# Patient Record
Sex: FEMALE | Race: WHITE | NOT HISPANIC OR LATINO | Employment: UNEMPLOYED | URBAN - METROPOLITAN AREA
[De-identification: names, ages, dates, MRNs, and addresses within clinical notes are randomized per-mention and may not be internally consistent; named-entity substitution may affect disease eponyms.]

---

## 2023-06-26 DIAGNOSIS — I82.413 ACUTE BILATERAL DEEP VEIN THROMBOSIS (DVT) OF FEMORAL VEINS (MULTI): Primary | ICD-10-CM

## 2023-06-26 PROBLEM — F43.10 PTSD (POST-TRAUMATIC STRESS DISORDER): Status: ACTIVE | Noted: 2023-06-26

## 2023-06-26 PROBLEM — K50.90 CROHN'S DISEASE (MULTI): Status: ACTIVE | Noted: 2023-06-26

## 2023-06-26 PROBLEM — F41.9 ANXIETY AND DEPRESSION: Status: ACTIVE | Noted: 2023-06-26

## 2023-06-26 PROBLEM — K21.9 CHRONIC GERD: Status: ACTIVE | Noted: 2023-06-26

## 2023-06-26 PROBLEM — F32.A ANXIETY AND DEPRESSION: Status: ACTIVE | Noted: 2023-06-26

## 2023-06-26 PROBLEM — L85.3 DRY SKIN DERMATITIS: Status: ACTIVE | Noted: 2023-06-26

## 2023-06-26 PROBLEM — I87.2 VENOUS INSUFFICIENCY: Status: ACTIVE | Noted: 2023-06-26

## 2023-06-26 PROBLEM — J20.9 ACUTE BRONCHITIS: Status: RESOLVED | Noted: 2023-06-26 | Resolved: 2023-06-26

## 2023-06-26 PROBLEM — D64.9 ANEMIA: Status: ACTIVE | Noted: 2023-06-26

## 2023-06-26 RX ORDER — PANTOPRAZOLE SODIUM 20 MG/1
1 TABLET, DELAYED RELEASE ORAL DAILY
COMMUNITY
Start: 2022-04-29 | End: 2023-07-05

## 2023-06-26 RX ORDER — MULTIVIT-MIN/IRON/FOLIC ACID/K 18-600-40
1 CAPSULE ORAL DAILY
COMMUNITY
Start: 2022-04-29

## 2023-06-26 RX ORDER — AMMONIUM LACTATE 12 G/100G
CREAM TOPICAL
COMMUNITY
Start: 2022-05-06

## 2023-06-26 RX ORDER — PAROXETINE HYDROCHLORIDE 20 MG/1
1 TABLET, FILM COATED ORAL DAILY
COMMUNITY
Start: 2022-04-29

## 2023-06-26 RX ORDER — ALBUTEROL SULFATE 90 UG/1
AEROSOL, METERED RESPIRATORY (INHALATION)
COMMUNITY

## 2023-06-26 RX ORDER — LORAZEPAM 0.5 MG/1
TABLET ORAL
COMMUNITY
Start: 2022-04-29

## 2023-06-26 RX ORDER — APIXABAN 5 MG/1
TABLET, FILM COATED ORAL
Qty: 180 TABLET | Refills: 0 | Status: SHIPPED | OUTPATIENT
Start: 2023-06-26 | End: 2023-09-25

## 2023-06-26 RX ORDER — ACETAMINOPHEN 325 MG/1
TABLET ORAL EVERY 6 HOURS PRN
COMMUNITY
Start: 2022-06-17

## 2023-06-26 RX ORDER — APIXABAN 5 MG/1
1 TABLET, FILM COATED ORAL 2 TIMES DAILY
COMMUNITY
Start: 2022-04-29 | End: 2023-06-26 | Stop reason: SDUPTHER

## 2023-06-26 RX ORDER — FERROUS SULFATE 325(65) MG
TABLET ORAL
COMMUNITY
Start: 2022-04-29

## 2023-07-05 DIAGNOSIS — K21.9 CHRONIC GERD: Primary | ICD-10-CM

## 2023-07-05 RX ORDER — PANTOPRAZOLE SODIUM 20 MG/1
20 TABLET, DELAYED RELEASE ORAL DAILY
Qty: 90 TABLET | Refills: 0 | Status: SHIPPED | OUTPATIENT
Start: 2023-07-05 | End: 2023-09-21

## 2023-09-20 DIAGNOSIS — K21.9 CHRONIC GERD: ICD-10-CM

## 2023-09-20 PROBLEM — F41.9 ANXIETY ABOUT BLUSHING: Status: ACTIVE | Noted: 2023-09-20

## 2023-09-20 PROBLEM — J45.909 REACTIVE AIRWAY DISEASE (HHS-HCC): Status: ACTIVE | Noted: 2023-09-20

## 2023-09-20 PROBLEM — D68.9 BLOOD COAGULATION DISORDER (MULTI): Status: ACTIVE | Noted: 2023-09-20

## 2023-09-20 PROBLEM — T81.31XA DEHISCENCE OF OPERATIVE WOUND: Status: RESOLVED | Noted: 2023-09-20 | Resolved: 2023-09-20

## 2023-09-20 PROBLEM — J45.41: Status: ACTIVE | Noted: 2023-09-20

## 2023-09-20 PROBLEM — R60.0 LOWER LEG EDEMA: Status: RESOLVED | Noted: 2023-09-20 | Resolved: 2023-09-20

## 2023-09-20 PROBLEM — K92.2 GASTROINTESTINAL HEMORRHAGE: Status: RESOLVED | Noted: 2023-09-20 | Resolved: 2023-09-20

## 2023-09-20 PROBLEM — O21.0 HYPEREMESIS GRAVIDARUM (HHS-HCC): Status: RESOLVED | Noted: 2023-09-20 | Resolved: 2023-09-20

## 2023-09-20 PROBLEM — I82.409 DEEP VENOUS THROMBOSIS (MULTI): Status: ACTIVE | Noted: 2023-09-20

## 2023-09-20 PROBLEM — F41.9 ANXIETY: Status: ACTIVE | Noted: 2023-09-20

## 2023-09-20 PROBLEM — B99.9 INFECTION: Status: RESOLVED | Noted: 2023-09-20 | Resolved: 2023-09-20

## 2023-09-20 PROBLEM — R11.2 NAUSEA & VOMITING: Status: RESOLVED | Noted: 2023-09-20 | Resolved: 2023-09-20

## 2023-09-20 PROBLEM — R45.89 ANXIETY ABOUT BLUSHING: Status: ACTIVE | Noted: 2023-09-20

## 2023-09-20 RX ORDER — ALPRAZOLAM 0.25 MG/1
TABLET ORAL
COMMUNITY
Start: 2020-04-03

## 2023-09-20 RX ORDER — DESLORATADINE 5 MG/1
TABLET ORAL
COMMUNITY
Start: 2020-05-27

## 2023-09-20 RX ORDER — FERROUS GLUCONATE 324(38)MG
TABLET ORAL
COMMUNITY
End: 2023-10-16 | Stop reason: ALTCHOICE

## 2023-09-21 RX ORDER — PANTOPRAZOLE SODIUM 20 MG/1
20 TABLET, DELAYED RELEASE ORAL DAILY
Qty: 90 TABLET | Refills: 3 | Status: SHIPPED | OUTPATIENT
Start: 2023-09-21 | End: 2023-10-16 | Stop reason: SDUPTHER

## 2023-09-25 DIAGNOSIS — I82.413 ACUTE BILATERAL DEEP VEIN THROMBOSIS (DVT) OF FEMORAL VEINS (MULTI): ICD-10-CM

## 2023-09-25 RX ORDER — APIXABAN 5 MG/1
TABLET, FILM COATED ORAL
Qty: 180 TABLET | Refills: 0 | Status: SHIPPED | OUTPATIENT
Start: 2023-09-25 | End: 2023-10-16 | Stop reason: SDUPTHER

## 2023-09-29 ENCOUNTER — APPOINTMENT (OUTPATIENT)
Dept: PRIMARY CARE | Facility: CLINIC | Age: 38
End: 2023-09-29
Payer: OTHER GOVERNMENT

## 2023-10-02 ENCOUNTER — APPOINTMENT (OUTPATIENT)
Dept: PRIMARY CARE | Facility: CLINIC | Age: 38
End: 2023-10-02
Payer: OTHER GOVERNMENT

## 2023-10-16 ENCOUNTER — OFFICE VISIT (OUTPATIENT)
Dept: PRIMARY CARE | Facility: CLINIC | Age: 38
End: 2023-10-16
Payer: OTHER GOVERNMENT

## 2023-10-16 VITALS
DIASTOLIC BLOOD PRESSURE: 74 MMHG | HEART RATE: 111 BPM | OXYGEN SATURATION: 97 % | TEMPERATURE: 97.8 F | SYSTOLIC BLOOD PRESSURE: 122 MMHG | HEIGHT: 67 IN | WEIGHT: 285.2 LBS | BODY MASS INDEX: 44.76 KG/M2

## 2023-10-16 DIAGNOSIS — I82.413 ACUTE BILATERAL DEEP VEIN THROMBOSIS (DVT) OF FEMORAL VEINS (MULTI): ICD-10-CM

## 2023-10-16 DIAGNOSIS — J45.41: ICD-10-CM

## 2023-10-16 DIAGNOSIS — K21.9 CHRONIC GERD: ICD-10-CM

## 2023-10-16 DIAGNOSIS — J40 BRONCHITIS: Primary | ICD-10-CM

## 2023-10-16 PROBLEM — O21.0 HYPEREMESIS GRAVIDARUM (HHS-HCC): Status: ACTIVE | Noted: 2023-09-20

## 2023-10-16 PROBLEM — R60.0 LOWER LEG EDEMA: Status: ACTIVE | Noted: 2023-09-20

## 2023-10-16 PROBLEM — D50.9 MICROCYTIC ANEMIA: Status: ACTIVE | Noted: 2023-06-26

## 2023-10-16 PROBLEM — K92.2 GASTROINTESTINAL HEMORRHAGE: Status: ACTIVE | Noted: 2023-09-20

## 2023-10-16 PROCEDURE — 99214 OFFICE O/P EST MOD 30 MIN: CPT | Performed by: NURSE PRACTITIONER

## 2023-10-16 PROCEDURE — 1036F TOBACCO NON-USER: CPT | Performed by: NURSE PRACTITIONER

## 2023-10-16 RX ORDER — LEVOFLOXACIN 500 MG/1
500 TABLET, FILM COATED ORAL DAILY
Qty: 10 TABLET | Refills: 0 | Status: SHIPPED | OUTPATIENT
Start: 2023-10-16 | End: 2023-10-27 | Stop reason: ALTCHOICE

## 2023-10-16 RX ORDER — PREDNISONE 10 MG/1
TABLET ORAL
Qty: 39 TABLET | Refills: 0 | Status: SHIPPED | OUTPATIENT
Start: 2023-10-16 | End: 2023-10-27 | Stop reason: ALTCHOICE

## 2023-10-16 RX ORDER — PREDNISONE 20 MG/1
40 TABLET ORAL DAILY
Qty: 10 TABLET | Refills: 0 | Status: SHIPPED | OUTPATIENT
Start: 2023-10-16 | End: 2023-10-16 | Stop reason: SDUPTHER

## 2023-10-16 RX ORDER — ALBUTEROL SULFATE 90 UG/1
2 AEROSOL, METERED RESPIRATORY (INHALATION) EVERY 4 HOURS PRN
Qty: 8.5 G | Refills: 0 | Status: SHIPPED | OUTPATIENT
Start: 2023-10-16 | End: 2024-10-15

## 2023-10-16 RX ORDER — PREDNISONE 10 MG/1
TABLET ORAL
Qty: 39 TABLET | Refills: 0 | Status: CANCELLED | OUTPATIENT
Start: 2023-10-16 | End: 2023-10-28

## 2023-10-16 RX ORDER — PANTOPRAZOLE SODIUM 20 MG/1
20 TABLET, DELAYED RELEASE ORAL DAILY
Qty: 90 TABLET | Refills: 3 | Status: SHIPPED | OUTPATIENT
Start: 2023-10-16 | End: 2024-04-03 | Stop reason: SDUPTHER

## 2023-10-16 ASSESSMENT — ENCOUNTER SYMPTOMS
CHEST TIGHTNESS: 1
FEVER: 0
GASTROINTESTINAL NEGATIVE: 1
WHEEZING: 1
COUGH: 1
MYALGIAS: 0
PSYCHIATRIC NEGATIVE: 1
SPUTUM PRODUCTION: 1
NEUROLOGICAL NEGATIVE: 1
CARDIOVASCULAR NEGATIVE: 1
SHORTNESS OF BREATH: 1
SORE THROAT: 1

## 2023-10-16 ASSESSMENT — COLUMBIA-SUICIDE SEVERITY RATING SCALE - C-SSRS
2. HAVE YOU ACTUALLY HAD ANY THOUGHTS OF KILLING YOURSELF?: NO
1. IN THE PAST MONTH, HAVE YOU WISHED YOU WERE DEAD OR WISHED YOU COULD GO TO SLEEP AND NOT WAKE UP?: NO
6. HAVE YOU EVER DONE ANYTHING, STARTED TO DO ANYTHING, OR PREPARED TO DO ANYTHING TO END YOUR LIFE?: NO

## 2023-10-16 ASSESSMENT — PATIENT HEALTH QUESTIONNAIRE - PHQ9
1. LITTLE INTEREST OR PLEASURE IN DOING THINGS: NOT AT ALL
2. FEELING DOWN, DEPRESSED OR HOPELESS: NOT AT ALL
SUM OF ALL RESPONSES TO PHQ9 QUESTIONS 1 AND 2: 0

## 2023-10-16 NOTE — PROGRESS NOTES
"Subjective   Patient ID: Taya Sultana is a 38 y.o. female who presents for Cough (Almost 2 weeks), Nasal Congestion, and Wheezing.    Cough, Congestion, and wheezing: started 2 weeks ago. It started as a sinus cold and feels it has moved to her lungs. She has a history of bronchitis.     Cough  This is a new problem. The current episode started 1 to 4 weeks ago. The problem has been gradually worsening. The problem occurs every few minutes. The cough is Productive of sputum. Associated symptoms include nasal congestion, postnasal drip, a sore throat, shortness of breath and wheezing. Pertinent negatives include no fever or myalgias. Nothing aggravates the symptoms. She has tried OTC cough suppressant for the symptoms. The treatment provided mild relief.   Wheezing   This is a new problem. The current episode started 1 to 4 weeks ago. The problem occurs 2 to 4 times per day. The problem has been gradually worsening. Associated symptoms include coughing, shortness of breath, a sore throat and sputum production. Pertinent negatives include no fever. Nothing aggravates the symptoms. She has tried OTC cough suppressant for the symptoms. The treatment provided no relief.        Review of Systems   Constitutional:  Negative for fever.   HENT:  Positive for postnasal drip and sore throat.    Respiratory:  Positive for cough, sputum production, chest tightness, shortness of breath and wheezing.    Cardiovascular: Negative.    Gastrointestinal: Negative.    Genitourinary: Negative.    Musculoskeletal:  Negative for myalgias.   Neurological: Negative.    Psychiatric/Behavioral: Negative.         Objective   /74   Pulse (!) 111   Temp 36.6 °C (97.8 °F)   Ht 1.702 m (5' 7\")   Wt 129 kg (285 lb 3.2 oz)   SpO2 97%   BMI 44.67 kg/m²     Physical Exam  Vitals reviewed.   Constitutional:       Appearance: Normal appearance.   HENT:      Head: Normocephalic.      Right Ear: Tympanic membrane, ear canal and external ear " normal.      Left Ear: Tympanic membrane, ear canal and external ear normal.      Nose: Congestion present.      Mouth/Throat:      Mouth: Mucous membranes are moist.   Cardiovascular:      Rate and Rhythm: Normal rate and regular rhythm.      Heart sounds: Normal heart sounds.   Pulmonary:      Effort: Pulmonary effort is normal.      Breath sounds: Wheezing present.      Comments: Cough heard on exam  Skin:     General: Skin is warm.   Neurological:      General: No focal deficit present.      Mental Status: She is alert and oriented to person, place, and time.         Assessment/Plan   Problem List Items Addressed This Visit             ICD-10-CM    Acute bilateral deep vein thrombosis (DVT) of femoral veins (CMS/[facility]) I82.413    Relevant Medications    apixaban (Eliquis) 5 mg tablet    Chronic GERD K21.9    Relevant Medications    pantoprazole (ProtoNix) 20 mg EC tablet    Moderate persistent asthma with allergic rhinitis with acute exacerbation J45.41    Relevant Medications    predniSONE (Deltasone) 10 mg tablet     Other Visit Diagnoses         Codes    Bronchitis    -  Primary J40    Relevant Medications    albuterol (ProAir HFA) 90 mcg/actuation inhaler    levoFLOXacin (Levaquin) 500 mg tablet    predniSONE (Deltasone) 10 mg tablet

## 2023-10-16 NOTE — PATIENT INSTRUCTIONS
You have bronchitis, and because you also have significant asthma we're treating it carefully. You'll be taking three medications: Albuterol, Prednisone, and Levaquin.    If your symptoms get worse or if you experience any severe symptoms, please don't hesitate to call 911 or go to the ER immediately. Your safety is important, and we're here to help you if you need it.

## 2023-10-27 ENCOUNTER — OFFICE VISIT (OUTPATIENT)
Dept: PRIMARY CARE | Facility: CLINIC | Age: 38
End: 2023-10-27
Payer: OTHER GOVERNMENT

## 2023-10-27 ENCOUNTER — ANCILLARY PROCEDURE (OUTPATIENT)
Dept: RADIOLOGY | Facility: CLINIC | Age: 38
End: 2023-10-27
Payer: OTHER GOVERNMENT

## 2023-10-27 VITALS
HEART RATE: 108 BPM | SYSTOLIC BLOOD PRESSURE: 128 MMHG | DIASTOLIC BLOOD PRESSURE: 76 MMHG | WEIGHT: 281.4 LBS | OXYGEN SATURATION: 98 % | HEIGHT: 67 IN | BODY MASS INDEX: 44.17 KG/M2 | TEMPERATURE: 98.1 F

## 2023-10-27 DIAGNOSIS — J45.41: ICD-10-CM

## 2023-10-27 DIAGNOSIS — J45.41: Primary | ICD-10-CM

## 2023-10-27 DIAGNOSIS — R06.02 SHORT OF BREATH ON EXERTION: ICD-10-CM

## 2023-10-27 PROCEDURE — 71046 X-RAY EXAM CHEST 2 VIEWS: CPT | Performed by: RADIOLOGY

## 2023-10-27 PROCEDURE — 71046 X-RAY EXAM CHEST 2 VIEWS: CPT | Mod: FY

## 2023-10-27 PROCEDURE — 99213 OFFICE O/P EST LOW 20 MIN: CPT | Performed by: NURSE PRACTITIONER

## 2023-10-27 PROCEDURE — 87634 RSV DNA/RNA AMP PROBE: CPT

## 2023-10-27 PROCEDURE — 87636 SARSCOV2 & INF A&B AMP PRB: CPT

## 2023-10-27 PROCEDURE — 1036F TOBACCO NON-USER: CPT | Performed by: NURSE PRACTITIONER

## 2023-10-27 RX ORDER — PREDNISONE 20 MG/1
40 TABLET ORAL DAILY
Qty: 10 TABLET | Refills: 0 | Status: SHIPPED | OUTPATIENT
Start: 2023-10-27 | End: 2023-11-01

## 2023-10-27 RX ORDER — ALBUTEROL SULFATE 0.83 MG/ML
2.5 SOLUTION RESPIRATORY (INHALATION) 4 TIMES DAILY PRN
Qty: 320 ML | Refills: 1 | Status: SHIPPED | OUTPATIENT
Start: 2023-10-27 | End: 2024-10-26

## 2023-10-27 RX ORDER — FLUTICASONE PROPIONATE AND SALMETEROL 250; 50 UG/1; UG/1
1 POWDER RESPIRATORY (INHALATION)
Qty: 60 EACH | Refills: 2 | Status: SHIPPED | OUTPATIENT
Start: 2023-10-27 | End: 2024-01-25

## 2023-10-27 ASSESSMENT — PATIENT HEALTH QUESTIONNAIRE - PHQ9
2. FEELING DOWN, DEPRESSED OR HOPELESS: NOT AT ALL
SUM OF ALL RESPONSES TO PHQ9 QUESTIONS 1 AND 2: 0
1. LITTLE INTEREST OR PLEASURE IN DOING THINGS: NOT AT ALL

## 2023-10-27 ASSESSMENT — ENCOUNTER SYMPTOMS
COUGH: 1
CHILLS: 0
WHEEZING: 1
FATIGUE: 1
FEVER: 0
GASTROINTESTINAL NEGATIVE: 1
CARDIOVASCULAR NEGATIVE: 1
SHORTNESS OF BREATH: 1

## 2023-10-27 ASSESSMENT — COLUMBIA-SUICIDE SEVERITY RATING SCALE - C-SSRS
6. HAVE YOU EVER DONE ANYTHING, STARTED TO DO ANYTHING, OR PREPARED TO DO ANYTHING TO END YOUR LIFE?: NO
2. HAVE YOU ACTUALLY HAD ANY THOUGHTS OF KILLING YOURSELF?: NO
1. IN THE PAST MONTH, HAVE YOU WISHED YOU WERE DEAD OR WISHED YOU COULD GO TO SLEEP AND NOT WAKE UP?: NO

## 2023-10-27 NOTE — PROGRESS NOTES
"Subjective   Patient ID: Taya Sultana is a 38 y.o. female who presents for Follow-up (Cough ).    Bronchitis: Treated with antiboitic levaquin and prednisone as well as inhalers. She reprots she noticed improvement of her upper airway sinus she has improvement but not her cough or lungs. She continues to have tight cough and sob.           Review of Systems   Constitutional:  Positive for fatigue. Negative for chills and fever.   Respiratory:  Positive for cough, shortness of breath and wheezing.    Cardiovascular: Negative.    Gastrointestinal: Negative.    Genitourinary: Negative.        Objective   /76   Pulse 108   Temp 36.7 °C (98.1 °F)   Ht 1.702 m (5' 7\")   Wt 128 kg (281 lb 6.4 oz)   LMP 10/02/2023   SpO2 98%   BMI 44.07 kg/m²     Physical Exam  Constitutional:       Appearance: She is ill-appearing.   HENT:      Head: Normocephalic.      Right Ear: Tympanic membrane and ear canal normal.      Left Ear: Tympanic membrane and ear canal normal.      Nose: No congestion.      Mouth/Throat:      Mouth: Mucous membranes are moist.   Cardiovascular:      Rate and Rhythm: Normal rate.      Heart sounds: Normal heart sounds.   Pulmonary:      Breath sounds: Examination of the right-upper field reveals wheezing. Examination of the left-upper field reveals wheezing. Examination of the right-middle field reveals wheezing. Examination of the left-middle field reveals wheezing. Examination of the right-lower field reveals wheezing. Examination of the left-lower field reveals wheezing. Wheezing present.      Comments: Tight cough heard on exam  Skin:     Capillary Refill: Capillary refill takes less than 2 seconds.   Neurological:      General: No focal deficit present.      Mental Status: She is oriented to person, place, and time.   Psychiatric:         Mood and Affect: Mood normal.         Behavior: Behavior normal.         Thought Content: Thought content normal.         Judgment: Judgment normal. "         Assessment/Plan   Problem List Items Addressed This Visit             ICD-10-CM    Moderate persistent asthma with allergic rhinitis with acute exacerbation - Primary J45.41    Relevant Medications    fluticasone propion-salmeteroL (Advair Diskus) 250-50 mcg/dose diskus inhaler    albuterol 2.5 mg /3 mL (0.083 %) nebulizer solution    predniSONE (Deltasone) 20 mg tablet    Other Relevant Orders    XR chest 2 views     Other Visit Diagnoses         Codes    Short of breath on exertion     R06.02    Relevant Medications    predniSONE (Deltasone) 20 mg tablet    Other Relevant Orders    Sars-CoV-2 PCR, Symptomatic    RSV PCR    Influenza A, and B PCR

## 2023-10-28 LAB
FLUAV RNA RESP QL NAA+PROBE: NOT DETECTED
FLUBV RNA RESP QL NAA+PROBE: NOT DETECTED
RSV RNA RESP QL NAA+PROBE: NOT DETECTED
SARS-COV-2 RNA RESP QL NAA+PROBE: NOT DETECTED

## 2023-12-27 NOTE — PATIENT INSTRUCTIONS
Has the pt been called?   Medication Plan: To address your breathing difficulties, we will take the following steps:    Another Burst of Prednisone: I am prescribing another round of prednisone to help manage inflammation and improve your breathing.  Advair Inhaler: You will start using an Advair inhaler as part of your daily maintenance. Please remember to rinse your mouth thoroughly after each use. Use it twice daily as prescribed.  Nebulizer Solution: I am also sending nebulizer solution for albuterol to help relieve shortness of breath or cough. You can use it every 6 hours or as needed.  Diagnostic Testing: Given the worsening of your symptoms, I have ordered a chest x-ray to provide additional insight into your condition.    Emergency Situations: In the event of severe shortness of breath, spiked fevers, or chills, please do not hesitate to call 911 or go directly to the emergency room for immediate evaluation and treatment.    COVID/Influenza/RSV Testing: We are also sending a swab for COVID, influenza, and RSV testing. Once the results are available, we will notify you promptly.    Provider Transition: As we discussed, I want to inform you that I will be leaving [facility]. This decision was not made lightly, and I deeply appreciate the trust you've placed in me for your care.    Follow-Up Options: You have several follow-up options available to you:    Dr. Carpenter is still available in this office, and you can schedule a follow-up with him.  Additionally, we will provide you with a list of nurse practitioners for consideration. A letter will be sent to you with their names and contact information.  Your health and well-being are of utmost importance, and I want to ensure a smooth transition in your care. If you have any questions or concerns at any point during your treatment, please reach out to us. Your trust in our care has been greatly appreciated.

## 2024-04-03 ENCOUNTER — TELEPHONE (OUTPATIENT)
Dept: PRIMARY CARE | Facility: CLINIC | Age: 39
End: 2024-04-03
Payer: OTHER GOVERNMENT

## 2024-04-03 DIAGNOSIS — I82.413 ACUTE BILATERAL DEEP VEIN THROMBOSIS (DVT) OF FEMORAL VEINS (MULTI): ICD-10-CM

## 2024-04-03 DIAGNOSIS — K21.9 CHRONIC GERD: ICD-10-CM

## 2024-04-03 NOTE — TELEPHONE ENCOUNTER
Ph[facility]: [facility]   Medication(s): eliquis 5 mg twice daily    Protonix 20 mg once daily   Dose:^  Qty: 6 months   Last Office Visit: 10/27/2023 KT   Next Office Visit:  08/05/2024 DARYA

## 2024-04-05 RX ORDER — PANTOPRAZOLE SODIUM 20 MG/1
20 TABLET, DELAYED RELEASE ORAL DAILY
Qty: 30 TABLET | Refills: 0 | Status: SHIPPED | OUTPATIENT
Start: 2024-04-05

## 2024-04-15 ENCOUNTER — TELEPHONE (OUTPATIENT)
Dept: PRIMARY CARE | Facility: CLINIC | Age: 39
End: 2024-04-15
Payer: OTHER GOVERNMENT

## 2024-04-15 DIAGNOSIS — I82.413 ACUTE BILATERAL DEEP VEIN THROMBOSIS (DVT) OF FEMORAL VEINS (MULTI): ICD-10-CM

## 2024-04-15 NOTE — TELEPHONE ENCOUNTER
Patient was sent a 30 day supply but is not scheduled until 8/5/2024     Can we send a 6 month to [facility] to get her through to her appointment.

## 2024-06-14 PROBLEM — E66.9 OBESITY WITH BODY MASS INDEX 30 OR GREATER: Status: ACTIVE | Noted: 2023-10-14

## 2024-06-14 PROBLEM — D50.0 IRON DEFICIENCY ANEMIA DUE TO CHRONIC BLOOD LOSS: Status: ACTIVE | Noted: 2019-02-27

## 2024-06-18 ENCOUNTER — APPOINTMENT (OUTPATIENT)
Dept: PRIMARY CARE | Facility: CLINIC | Age: 39
End: 2024-06-18
Payer: OTHER GOVERNMENT

## 2024-06-18 VITALS
SYSTOLIC BLOOD PRESSURE: 122 MMHG | DIASTOLIC BLOOD PRESSURE: 80 MMHG | BODY MASS INDEX: 38.17 KG/M2 | HEART RATE: 95 BPM | OXYGEN SATURATION: 99 % | HEIGHT: 67 IN | WEIGHT: 243.2 LBS

## 2024-06-18 DIAGNOSIS — I82.90 DEEP VEIN THROMBOSIS (DVT) OF NON-EXTREMITY VEIN, UNSPECIFIED CHRONICITY: ICD-10-CM

## 2024-06-18 DIAGNOSIS — E55.9 VITAMIN D DEFICIENCY: ICD-10-CM

## 2024-06-18 DIAGNOSIS — K50.919 CROHN'S DISEASE WITH COMPLICATION, UNSPECIFIED GASTROINTESTINAL TRACT LOCATION (MULTI): ICD-10-CM

## 2024-06-18 DIAGNOSIS — E28.2 PCOS (POLYCYSTIC OVARIAN SYNDROME): ICD-10-CM

## 2024-06-18 DIAGNOSIS — E66.9 OBESITY WITH BODY MASS INDEX 30 OR GREATER: ICD-10-CM

## 2024-06-18 DIAGNOSIS — Z00.00 ROUTINE ADULT HEALTH MAINTENANCE: Primary | ICD-10-CM

## 2024-06-18 DIAGNOSIS — F41.9 ANXIETY: ICD-10-CM

## 2024-06-18 DIAGNOSIS — J45.909 REACTIVE AIRWAY DISEASE WITHOUT COMPLICATION, UNSPECIFIED ASTHMA SEVERITY, UNSPECIFIED WHETHER PERSISTENT (HHS-HCC): ICD-10-CM

## 2024-06-18 PROCEDURE — 99214 OFFICE O/P EST MOD 30 MIN: CPT | Performed by: NURSE PRACTITIONER

## 2024-06-18 PROCEDURE — 1036F TOBACCO NON-USER: CPT | Performed by: NURSE PRACTITIONER

## 2024-06-18 RX ORDER — LORAZEPAM 0.5 MG/1
0.5 TABLET ORAL DAILY PRN
Qty: 15 TABLET | Refills: 0 | Status: SHIPPED | OUTPATIENT
Start: 2024-06-18 | End: 2024-07-18

## 2024-06-18 ASSESSMENT — ENCOUNTER SYMPTOMS
ABDOMINAL PAIN: 0
ABDOMINAL DISTENTION: 0
HEADACHES: 0
BRUISES/BLEEDS EASILY: 0
SEIZURES: 0
DIZZINESS: 0
CONSTIPATION: 0
SHORTNESS OF BREATH: 0
WHEEZING: 0
PALPITATIONS: 0
MYALGIAS: 0
NAUSEA: 0
TROUBLE SWALLOWING: 0
COUGH: 0
WOUND: 0
JOINT SWELLING: 0
FATIGUE: 0
DIARRHEA: 0
COLOR CHANGE: 0
WEAKNESS: 0
EYE PAIN: 0
ADENOPATHY: 0
FEVER: 0
CHILLS: 0
DIFFICULTY URINATING: 0

## 2024-06-18 ASSESSMENT — PATIENT HEALTH QUESTIONNAIRE - PHQ9
2. FEELING DOWN, DEPRESSED OR HOPELESS: NOT AT ALL
1. LITTLE INTEREST OR PLEASURE IN DOING THINGS: NOT AT ALL
1. LITTLE INTEREST OR PLEASURE IN DOING THINGS: NOT AT ALL
2. FEELING DOWN, DEPRESSED OR HOPELESS: NOT AT ALL
SUM OF ALL RESPONSES TO PHQ9 QUESTIONS 1 AND 2: 0
SUM OF ALL RESPONSES TO PHQ9 QUESTIONS 1 AND 2: 0

## 2024-06-18 NOTE — ASSESSMENT & PLAN NOTE
Patient continues on Eliquis twice daily without issue.  Bilateral lower extremity blood clots originally reported in 2022.  Patient still reports history of blood clots as a teenager.  She states that Crohn's disease has put her at an increased risk for clotting.  Will continue OAC for now.  Consider hematology follow-up in the future to discuss continued need of blood thinner.
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
## 2024-06-18 NOTE — ASSESSMENT & PLAN NOTE
Stable per patient.  Provider to be notified of any new or worsening gastric concerns as specialty referral may need to be placed.

## 2024-06-18 NOTE — ASSESSMENT & PLAN NOTE
Patient to continue with current inhaler regiment. She is to notify provider for any persistent or worsening respiratory concerns

## 2024-06-18 NOTE — ASSESSMENT & PLAN NOTE
Reportedly stable off of maintenance medication.  Prescription for lorazepam renewed and to be used as needed for panic attacks.  Consent signed today and urine screening obtained.  Patient to notify provider for any persistent or new mood concerns.

## 2024-08-05 ENCOUNTER — APPOINTMENT (OUTPATIENT)
Dept: PRIMARY CARE | Facility: CLINIC | Age: 39
End: 2024-08-05
Payer: OTHER GOVERNMENT

## 2024-10-02 ENCOUNTER — LAB (OUTPATIENT)
Dept: LAB | Facility: LAB | Age: 39
End: 2024-10-02
Payer: OTHER GOVERNMENT

## 2024-10-02 DIAGNOSIS — Z00.00 ROUTINE ADULT HEALTH MAINTENANCE: ICD-10-CM

## 2024-10-02 DIAGNOSIS — E66.9 OBESITY WITH BODY MASS INDEX 30 OR GREATER: ICD-10-CM

## 2024-10-02 DIAGNOSIS — E55.9 VITAMIN D DEFICIENCY: ICD-10-CM

## 2024-10-02 LAB
25(OH)D3 SERPL-MCNC: 25 NG/ML (ref 30–100)
ALBUMIN SERPL BCP-MCNC: 3.4 G/DL (ref 3.4–5)
ALP SERPL-CCNC: 67 U/L (ref 33–110)
ALT SERPL W P-5'-P-CCNC: 6 U/L (ref 7–45)
ANION GAP SERPL CALC-SCNC: 11 MMOL/L (ref 10–20)
AST SERPL W P-5'-P-CCNC: 11 U/L (ref 9–39)
BASOPHILS # BLD AUTO: 0.07 X10*3/UL (ref 0–0.1)
BASOPHILS NFR BLD AUTO: 0.6 %
BILIRUB SERPL-MCNC: 0.2 MG/DL (ref 0–1.2)
BUN SERPL-MCNC: 9 MG/DL (ref 6–23)
CALCIUM SERPL-MCNC: 8.9 MG/DL (ref 8.6–10.3)
CHLORIDE SERPL-SCNC: 103 MMOL/L (ref 98–107)
CHOLEST SERPL-MCNC: 138 MG/DL (ref 0–199)
CHOLESTEROL/HDL RATIO: 2.4
CO2 SERPL-SCNC: 28 MMOL/L (ref 21–32)
CREAT SERPL-MCNC: 0.83 MG/DL (ref 0.5–1.05)
EGFRCR SERPLBLD CKD-EPI 2021: >90 ML/MIN/1.73M*2
EOSINOPHIL # BLD AUTO: 0.75 X10*3/UL (ref 0–0.7)
EOSINOPHIL NFR BLD AUTO: 6.7 %
ERYTHROCYTE [DISTWIDTH] IN BLOOD BY AUTOMATED COUNT: 15.7 % (ref 11.5–14.5)
EST. AVERAGE GLUCOSE BLD GHB EST-MCNC: 94 MG/DL
GLUCOSE SERPL-MCNC: 94 MG/DL (ref 74–99)
HBA1C MFR BLD: 4.9 %
HCT VFR BLD AUTO: 34.8 % (ref 36–46)
HDLC SERPL-MCNC: 56.7 MG/DL
HGB BLD-MCNC: 10 G/DL (ref 12–16)
IMM GRANULOCYTES # BLD AUTO: 0.03 X10*3/UL (ref 0–0.7)
IMM GRANULOCYTES NFR BLD AUTO: 0.3 % (ref 0–0.9)
IRON SATN MFR SERPL: 5 % (ref 25–45)
IRON SERPL-MCNC: 19 UG/DL (ref 35–150)
LDLC SERPL CALC-MCNC: 61 MG/DL
LYMPHOCYTES # BLD AUTO: 2.67 X10*3/UL (ref 1.2–4.8)
LYMPHOCYTES NFR BLD AUTO: 23.8 %
MCH RBC QN AUTO: 23.3 PG (ref 26–34)
MCHC RBC AUTO-ENTMCNC: 28.7 G/DL (ref 32–36)
MCV RBC AUTO: 81 FL (ref 80–100)
MONOCYTES # BLD AUTO: 1.15 X10*3/UL (ref 0.1–1)
MONOCYTES NFR BLD AUTO: 10.2 %
NEUTROPHILS # BLD AUTO: 6.56 X10*3/UL (ref 1.2–7.7)
NEUTROPHILS NFR BLD AUTO: 58.4 %
NON HDL CHOLESTEROL: 81 MG/DL (ref 0–149)
NRBC BLD-RTO: 0 /100 WBCS (ref 0–0)
PLATELET # BLD AUTO: 710 X10*3/UL (ref 150–450)
POTASSIUM SERPL-SCNC: 4.2 MMOL/L (ref 3.5–5.3)
PROT SERPL-MCNC: 6.7 G/DL (ref 6.4–8.2)
RBC # BLD AUTO: 4.29 X10*6/UL (ref 4–5.2)
SODIUM SERPL-SCNC: 138 MMOL/L (ref 136–145)
TIBC SERPL-MCNC: 385 UG/DL (ref 240–445)
TRIGL SERPL-MCNC: 104 MG/DL (ref 0–149)
TSH SERPL-ACNC: 3.22 MIU/L (ref 0.44–3.98)
UIBC SERPL-MCNC: 366 UG/DL (ref 110–370)
VLDL: 21 MG/DL (ref 0–40)
WBC # BLD AUTO: 11.2 X10*3/UL (ref 4.4–11.3)

## 2024-10-02 PROCEDURE — 36415 COLL VENOUS BLD VENIPUNCTURE: CPT

## 2024-10-02 PROCEDURE — 82306 VITAMIN D 25 HYDROXY: CPT

## 2024-10-03 ENCOUNTER — TELEPHONE (OUTPATIENT)
Dept: PRIMARY CARE | Facility: CLINIC | Age: 39
End: 2024-10-03
Payer: OTHER GOVERNMENT

## 2024-10-03 DIAGNOSIS — K50.919 CROHN'S DISEASE WITH COMPLICATION, UNSPECIFIED GASTROINTESTINAL TRACT LOCATION: Primary | ICD-10-CM

## 2024-10-03 DIAGNOSIS — D50.0 IRON DEFICIENCY ANEMIA DUE TO CHRONIC BLOOD LOSS: ICD-10-CM

## 2024-10-03 RX ORDER — CHOLECALCIFEROL (VITAMIN D3) 25 MCG
25 TABLET ORAL DAILY
Qty: 90 TABLET | Refills: 3 | Status: SHIPPED | OUTPATIENT
Start: 2024-10-03 | End: 2025-10-03

## 2024-10-03 NOTE — TELEPHONE ENCOUNTER
Patient's vitamin D levels are only insufficient so high-dose vitamin D is not indicated at this time.  Vitamin D must be purchased over-the-counter.  Nutritionist consult placed per patient request.  Patient also states that she has not been taking iron supplements routinely.  Will recheck CBC and iron levels in 1 month.  Depending on results, will patient follow-up with hematology

## 2024-10-06 DIAGNOSIS — D50.9 MICROCYTIC ANEMIA: Primary | ICD-10-CM

## 2024-10-06 RX ORDER — FERROUS ASPARTO GLYCINATE, IRON, ASCORBIC ACID, FOLIC ACID, CYANOCOBALAMIN, ZINC, SUCCINIC ACID, AND INTRINSIC FACTOR 50; 100; 60; 750; 250; 25; 15; 50; 100 MG/1; MG/1; MG/1; UG/1; UG/1; UG/1; MG/1; MG/1; MG/1
1 TABLET ORAL DAILY
Qty: 90 TABLET | Refills: 3 | Status: SHIPPED | OUTPATIENT
Start: 2024-10-06 | End: 2025-10-06

## 2024-10-07 DIAGNOSIS — D50.0 IRON DEFICIENCY ANEMIA DUE TO CHRONIC BLOOD LOSS: Primary | ICD-10-CM

## 2024-10-07 RX ORDER — IRON POLYSACCHARIDE COMPLEX 150 MG
150 CAPSULE ORAL DAILY
Qty: 90 CAPSULE | Refills: 3 | Status: SHIPPED | OUTPATIENT
Start: 2024-10-07 | End: 2025-10-07

## 2024-10-25 DIAGNOSIS — I82.413 ACUTE BILATERAL DEEP VEIN THROMBOSIS (DVT) OF FEMORAL VEINS (MULTI): ICD-10-CM

## 2024-10-25 DIAGNOSIS — K21.9 CHRONIC GERD: ICD-10-CM

## 2024-10-25 RX ORDER — PANTOPRAZOLE SODIUM 20 MG/1
20 TABLET, DELAYED RELEASE ORAL DAILY
Qty: 90 TABLET | Refills: 0 | Status: SHIPPED | OUTPATIENT
Start: 2024-10-25

## 2024-11-07 ENCOUNTER — APPOINTMENT (OUTPATIENT)
Dept: PRIMARY CARE | Facility: CLINIC | Age: 39
End: 2024-11-07
Payer: OTHER GOVERNMENT

## 2024-11-19 ENCOUNTER — NUTRITION (OUTPATIENT)
Dept: NUTRITION | Facility: HOSPITAL | Age: 39
End: 2024-11-19
Payer: COMMERCIAL

## 2024-11-19 VITALS — HEIGHT: 67 IN | BODY MASS INDEX: 45.17 KG/M2 | WEIGHT: 287.79 LBS

## 2024-11-19 DIAGNOSIS — K50.919 CROHN'S DISEASE WITH COMPLICATION, UNSPECIFIED GASTROINTESTINAL TRACT LOCATION: Primary | ICD-10-CM

## 2024-11-19 PROCEDURE — 97802 MEDICAL NUTRITION INDIV IN: CPT

## 2024-11-19 NOTE — PATIENT INSTRUCTIONS
Nutrition Education Topics Discussed:   Provided Keys for a Healthy Lifestyle Handout. Discussed the following:  Start a daily exercise routine. Adults should target 150 minutes of moderate intensity exercise each week. Start slow and work your way up to this amount. Provided examples of aerobic, resistance, and stretching/balance activities.  Plan balanced meals. The “Plate Method” is a tool used to plan balanced meals. Aim for the following:  One-third to half the plate (or the meal) should be a non-starchy vegetable. Non-starchy vegetables include broccoli, cauliflower, salad greens, carrots, cucumbers, asparagus, green beans, tomatoes, and many more.  One-third to a quarter of the plate should be a lean protein. Lean proteins include chicken, turkey, fish, lean beef, eggs, nuts, tofu.  One third to a quarter of the plate can be a complex starch or carbohydrate. Examples of complex starches / carbohydrates include starchy vegetables (potatoes, peas, corn, and butternut squash), grains (whole wheat bread, quinoa, and brown rice), legumes (lentils and beans), and fruit.  Olive oil should be the primary fat source used for cooking.  Use a 9-10 inch plate to help manage portions  Pre-plan meal ideas. Spending time planning upfront saves you from relying on convenience foods. It can also help you save money! Your plan does not need to be rigid, but you should have some idea of what meals you are going to make going into the week. Place this information on the refrigerator in plain sight. There are many products you can purchase to help keep you organized.   Stay hydrated with water or other lower calorie beverages. We often confuse our body's signal for thirst with being hungry. Make sure you are staying hydrated, preferably with water. The best indicator of hydration is your urine. It should be a pale yellow. Provided examples of sugar-free beverages and ways to flavor water.   Pay attention to your body's hunger and  "fullness cues. Introduced patient to using a scale of 1-10 to rate hunger / satiety. Reviewed signs of hunger that patient might or might not feel, and encouraged being attentive to these signals if they are present. Encouraged patient to eat when feeling a \"3-4\" on the scale instead of waiting for hunger to become more severe. Encouraged patient to aim to stop eating when feeling at a \"6\" (satisfied, but could eat a little more) or a \"7\" (full but not uncomfortable). Recommended eating slowly and checking in with body to determine when body is really full. Discussed that it takes the brain around 10-15 minutes after eating to feel full. Encouraged using this method in conjunction with balanced foods on the plate using the Plate Method.      Discussed importance of consistent meal pattern   Discussed how eating consistently and balanced meals help improve satiety, boot metabolism and helps to improve blood glucose levels   Encouraged pt to eat first meal of the day within 1-2hrs after waking up to help boost metabolism   Encouraged meals and snacks to be  throughout the day with no more than a 3-4hr gap in between to help boost metabolism      Educational Handouts Provided: [facility] Balanced Breakfast, High Protein Meal Ideas, High Protein Snack Ideas, and Keys for a Healthy Lifestyle     Patient Goals:   Aim to workout on recumbent bike for 15 mins 2 days a week    Aim to have a vegetable with dinner meal 3 days a week     Aim to have breakfast within 1-2 hours after waking up  "

## 2024-11-19 NOTE — PROGRESS NOTES
Nutrition Initial Assessment:     Patient Taya Sultana is a 39 y.o. female being seen at [facility] location who was referred by ROSHAN Estrada  on 10/3/24 for   1. Crohn's disease with complication, unspecified gastrointestinal tract location  Referral to Nutrition Services          Nutrition Assessment    Patient Active Problem List   Diagnosis    Acute bilateral deep vein thrombosis (DVT) of femoral veins (Multi)    Microcytic anemia    Anxiety and depression    PTSD (post-traumatic stress disorder)    Chronic GERD    Crohn's disease (Multi)    Dry skin dermatitis    Venous insufficiency    Anxiety    Anxiety about blushing    Blood coagulation disorder (Multi)    Deep venous thrombosis (Multi)    Gastrointestinal hemorrhage    Hyperemesis gravidarum (HHS-HCC)    Lower leg edema    Moderate persistent asthma with allergic rhinitis with acute exacerbation (HHS-HCC)    Reactive airway disease (HHS-HCC)    Obesity with body mass index 30 or greater    Iron deficiency anemia due to chronic blood loss    PCOS (polycystic ovarian syndrome)    Routine adult health maintenance     Nutrition History:  Food & Nutrition History: Here with her  (Erik) and is here for wt loss. Has a h/o Crohn's and is familiar with what foods can trigger her so she knows what to stay away from.  Food Allergies: none  Food Intolerances: lactose intolerant; stays aways from corn as upsets her Crohn's  Vitamin/mineral intake: D, C, Folate  Iron  Prescription medications:    GI Symptoms: nausea (only when she does not eat when she takes her iron; otherwise no issues); Occurring >2 weeks?    Oral Problems: denies; Dentition: own  Sleep Habits: 7+ hrs continuous (goes to bed 9-9:30pm; wakes up 4:45-5am)    Diet Recall:  Meal 1: B (8:30am-9am): oatmeal OR bagel OR pancakes/waffles OR a pc of fruit OR smoothie  Meal 2: L (11am-1:30pm) If she does eat will be a sandwich or leftovers; sometimes skips (most days of the week)  Meal 3:  "D (5pm-7:30pm): cereal with lactaid milk OR Quesadilla  cheese with corn tortilla; dinner usually light; meals sometimes later depending on 's schedule  Snacks: ~8pm A few cookies or sometimes nothing  Food Variety: Absent (minimal veggies in diet; but sometimes will have salad with dinner but may eat veggies once or twice a week)  Oral Nutrition Supplement Use:  (none)  Fluid Intake: mostly water and coffee with ~2TBSP sweetend creamer or black coffee  Energy Intake: Good > 75 %      Food Preparation:  Cooking: Patient, Spouse/Significant Other  Grocery Shopping: Spouse/Significant Other, Patient  Dining Out: 1 to 3 times a month    Physical Activity:   None currently; open to starting some form of exercise.    Food Security/Insecurity: Food / Nutrition Program Participation:  (no issues)    Anthropometrics:  Height: 170.2 cm (5' 7.01\")   Weight: 131 kg (287 lb 12.6 oz) (standing scale obtained today)   BMI (Calculated): 45.06    IBW/kg (Dietitian Calculated): 213 kg Percent of IBW: 78.7 %          Weight History:   Daily Weight  06/18/24 : 110 kg (243 lb 3.2 oz) -- per pt this wt was inaccurate, stated the wt should have been 283#  04/10/24 : 132 kg (291 lb 0.1 oz)  12/16/23 : 135 kg (297 lb 2.9 oz)  10/27/23 : 128 kg (281 lb 6.4 oz)  10/16/23 : 129 kg (285 lb 3.2 oz)  11/18/22 : 112 kg (246 lb 6 oz)  10/17/22 : 110 kg (243 lb 6 oz)  09/22/22 : 104 kg (229 lb 8 oz)  06/07/22 : 94.8 kg (209 lb)  05/06/22 : 96.3 kg (212 lb 6 oz)    Nutrition Focused Physical Exam Findings:  Deferred as pt visually appears well-nourished with no signs of muscle or subcutaneous fat losses      Nutrition Significant Labs:  CMP trend:    Recent Labs     10/02/24  0951 04/29/22  0828 04/07/22  1132 04/06/22  0732   GLUCOSE 94 85 106* 99    138 141 141   K 4.2 4.2 4.1 3.9    104 108* 108*   CO2 28 27 21* 19*   ANIONGAP 11 11 12 14   BUN 9 10 6* 7*   CREATININE 0.83 0.69 0.9 0.9   EGFR >90  --  86 86   CALCIUM 8.9 9.0 " 8.5 8.7   ALBUMIN 3.4 3.3*  --  3.2*   ALKPHOS 67 57  --  80   PROT 6.7 6.8  --  6.5   AST 11 11  --  18   BILITOT 0.2 0.3  --  1.4*   ALT 6* 6*  --  6   , Lipid Panel trend:    Recent Labs     10/02/24  0951   CHOL 138   HDL 56.7   LDLCALC 61   VLDL 21   TRIG 104   , Hgb A1c trend:   Recent Labs     10/02/24  0951   HGBA1C 4.9   , Vit D:   Lab Results   Component Value Date    VITD25 25 (L) 10/02/2024    , Iron Panel:   Lab Results   Component Value Date    IRON 19 (L) 10/02/2024    TIBC 385 10/02/2024    FERRITIN 10 (L) 04/05/2022    , Vit B12:   Lab Results   Component Value Date    PXZFOVJG21 562 04/05/2022    , and Folate:   Lab Results   Component Value Date    FOLATE 15.9 04/05/2022        Nutrition Specific Medications:  Current Outpatient Medications   Medication Instructions    acetaminophen (Tylenol) 325 mg tablet oral, Every 6 hours PRN    albuterol (ProAir HFA) 90 mcg/actuation inhaler 2 puffs, inhalation, Every 4 hours PRN    albuterol 90 mcg/actuation inhaler INHALE 2 PUFFS BY MOUTH 1 TO 2 MINUTES APART EVERY 4 HOURS AS NEEDED FOR COUGH  WHEEZING  SHORTNESS OF BREATH  OR PRIOR TO EXERCISE.    albuterol 2.5 mg, nebulization, 4 times daily PRN    ammonium lactate (Amlactin) 12 % cream APPLY  AND RUB  IN A THIN FILM TO AFFECTED AREAS TWICE DAILY.(AM AND PM).    apixaban (Eliquis) 5 mg tablet TAKE 1 TABLET TWICE A DAY (NEEDS APPOINTMENT PRIOR TO ADDITIONAL REFILLS)    ascorbic acid, vitamin C, 500 mg capsule 1 capsule, oral, Daily    cholecalciferol (VITAMIN D-3) 25 mcg, oral, Daily    desloratadine (Clarinex) 5 mg tablet 1 tablet Orally Once a day for 90 days    fluticasone propion-salmeteroL (Advair Diskus) 250-50 mcg/dose diskus inhaler 1 puff, inhalation, 2 times daily RT, Rinse mouth with water after use to reduce aftertaste and incidence of candidiasis. Do not swallow.    iron ag,[id number]-sa-sto (Niferex, Sumalate-Quatrefolic,) 150 mg iron- 60 mg-1 mg tablet 1 tablet, oral, Daily, Take daily  in morning with breakfast    iron polysaccharides (IFEREX 150) 150 mg, oral, Daily    LORazepam (ATIVAN) 0.5 mg, oral, Daily PRN    pantoprazole (PROTONIX) 20 mg, oral, Daily        Estimated Needs: did not discuss today    ;     ;         Nutrition Diagnosis   Malnutrition Diagnosis  Patient has Malnutrition Diagnosis: No    Nutrition Diagnosis  Patient has Nutrition Diagnosis: Yes  Diagnosis Status (1): New  Nutrition Diagnosis 1: Food and nutrition related knowledge deficit  Related to (1): limited or lack of prior nutrition-related education  As Evidenced by (1): per pt report looking for guidance on healthy eating patterns, intake of unbalanced meals and snacks per diet recall       Nutrition Interventions/Recommendations   Nutrition Prescription: General healthy diet    Nutrition Interventions:   Food and Nutrient Delivery: Meals & Snacks: Energy-modified diet, General Healthful Diet  Goals: 3 balanced meals a day with 1-2 balanced snacks as needed     Coordination of Care:       Nutrition Education:   Nutrition Education Content: Content related nutrition education  Goals: Balanced meals using plate method, timing of meals, adequate hydration, benefits of exercise    Nutrition Education Topics Discussed:   Provided Keys for a Healthy Lifestyle Handout. Discussed the following:  Start a daily exercise routine. Adults should target 150 minutes of moderate intensity exercise each week. Start slow and work your way up to this amount. Provided examples of aerobic, resistance, and stretching/balance activities.  Plan balanced meals. The “Plate Method” is a tool used to plan balanced meals. Aim for the following:  One-third to half the plate (or the meal) should be a non-starchy vegetable. Non-starchy vegetables include broccoli, cauliflower, salad greens, carrots, cucumbers, asparagus, green beans, tomatoes, and many more.  One-third to a quarter of the plate should be a lean protein. Lean proteins include chicken,  "turkey, fish, lean beef, eggs, nuts, tofu.  One third to a quarter of the plate can be a complex starch or carbohydrate. Examples of complex starches / carbohydrates include starchy vegetables (potatoes, peas, corn, and butternut squash), grains (whole wheat bread, quinoa, and brown rice), legumes (lentils and beans), and fruit.  Olive oil should be the primary fat source used for cooking.  Use a 9-10 inch plate to help manage portions  Pre-plan meal ideas. Spending time planning upfront saves you from relying on convenience foods. It can also help you save money! Your plan does not need to be rigid, but you should have some idea of what meals you are going to make going into the week. Place this information on the refrigerator in plain sight. There are many products you can purchase to help keep you organized.   Stay hydrated with water or other lower calorie beverages. We often confuse our body's signal for thirst with being hungry. Make sure you are staying hydrated, preferably with water. The best indicator of hydration is your urine. It should be a pale yellow. Provided examples of sugar-free beverages and ways to flavor water.   Pay attention to your body's hunger and fullness cues. Introduced patient to using a scale of 1-10 to rate hunger / satiety. Reviewed signs of hunger that patient might or might not feel, and encouraged being attentive to these signals if they are present. Encouraged patient to eat when feeling a \"3-4\" on the scale instead of waiting for hunger to become more severe. Encouraged patient to aim to stop eating when feeling at a \"6\" (satisfied, but could eat a little more) or a \"7\" (full but not uncomfortable). Recommended eating slowly and checking in with body to determine when body is really full. Discussed that it takes the brain around 10-15 minutes after eating to feel full. Encouraged using this method in conjunction with balanced foods on the plate using the Plate Method.  "     Discussed importance of consistent meal pattern   Discussed how eating consistently and balanced meals help improve satiety, boot metabolism and helps to improve blood glucose levels   Encouraged pt to eat first meal of the day within 1-2hrs after waking up to help boost metabolism   Encouraged meals and snacks to be  throughout the day with no more than a 3-4hr gap in between to help boost metabolism      Educational Handouts Provided: [facility] Balanced Breakfast, High Protein Meal Ideas, High Protein Snack Ideas, and Keys for a Healthy Lifestyle     Nutrition Counseling: Strategies: Nutrition counseling based on motivational interviewing strategy, Nutrition counseling based on goal setting strategy    Patient Goals: 1. Aim to workout on recumbent bike for 15 mins 2 days a week    2. Aim to have a vegetable with dinner meal 3 days a week   3. Aim to have breakfast within 1-2 hours after waking up    Readiness to Change : Good  Level of Understanding : Good  Anticipated Compliant : Good       Nutrition Monitoring and Evaluation   Food/Nutrient Related History Monitoring  Monitoring and Evaluation Plan: Meal/snack pattern  Meal/Snack Pattern: Estimated meal and snack pattern  Criteria: Consume 3 balanced meals per day using plate method & 1-2 balanced snacks a day       Body Composition/Growth/Weight History  Monitoring and Evaluation Plan: Weight  Weight: Measured weight  Criteria: Intentional weight loss of 0.5-2 lb per week, trending toward a clinically significant weight loss of 5-10% of current body weight.            Follow Up: 6-8 weeks      Time Spent  Prep time on day of patient encounter: 5 minutes  Time spent directly with patient, family or caregiver: 57 minutes  Documentation Time: 5 minutes

## 2024-11-27 PROBLEM — M79.89 SYMPTOM OF LEG SWELLING: Status: ACTIVE | Noted: 2024-11-27

## 2024-12-02 ENCOUNTER — APPOINTMENT (OUTPATIENT)
Dept: PRIMARY CARE | Facility: CLINIC | Age: 39
End: 2024-12-02
Payer: OTHER GOVERNMENT

## 2024-12-02 VITALS
HEART RATE: 100 BPM | HEIGHT: 67 IN | OXYGEN SATURATION: 98 % | SYSTOLIC BLOOD PRESSURE: 124 MMHG | WEIGHT: 285 LBS | BODY MASS INDEX: 44.73 KG/M2 | TEMPERATURE: 97.8 F | DIASTOLIC BLOOD PRESSURE: 78 MMHG

## 2024-12-02 DIAGNOSIS — I82.90 DEEP VEIN THROMBOSIS (DVT) OF NON-EXTREMITY VEIN, UNSPECIFIED CHRONICITY: ICD-10-CM

## 2024-12-02 DIAGNOSIS — F41.9 ANXIETY: ICD-10-CM

## 2024-12-02 DIAGNOSIS — Z00.00 ROUTINE ADULT HEALTH MAINTENANCE: ICD-10-CM

## 2024-12-02 DIAGNOSIS — J40 BRONCHITIS: ICD-10-CM

## 2024-12-02 DIAGNOSIS — D50.9 MICROCYTIC ANEMIA: ICD-10-CM

## 2024-12-02 DIAGNOSIS — J45.901 EXACERBATION OF ASTHMA, UNSPECIFIED ASTHMA SEVERITY, UNSPECIFIED WHETHER PERSISTENT (HHS-HCC): Primary | ICD-10-CM

## 2024-12-02 DIAGNOSIS — J45.41: ICD-10-CM

## 2024-12-02 DIAGNOSIS — K50.919 CROHN'S DISEASE WITH COMPLICATION, UNSPECIFIED GASTROINTESTINAL TRACT LOCATION: ICD-10-CM

## 2024-12-02 PROBLEM — O21.0 HYPEREMESIS GRAVIDARUM (HHS-HCC): Status: RESOLVED | Noted: 2023-09-20 | Resolved: 2024-12-02

## 2024-12-02 PROBLEM — I82.413 ACUTE BILATERAL DEEP VEIN THROMBOSIS (DVT) OF FEMORAL VEINS (MULTI): Status: RESOLVED | Noted: 2023-06-26 | Resolved: 2024-12-02

## 2024-12-02 PROBLEM — E66.9 OBESITY WITH BODY MASS INDEX 30 OR GREATER: Status: RESOLVED | Noted: 2023-10-14 | Resolved: 2024-12-02

## 2024-12-02 PROBLEM — I87.2 VENOUS INSUFFICIENCY: Status: RESOLVED | Noted: 2023-06-26 | Resolved: 2024-12-02

## 2024-12-02 PROBLEM — D50.0 IRON DEFICIENCY ANEMIA DUE TO CHRONIC BLOOD LOSS: Status: RESOLVED | Noted: 2019-02-27 | Resolved: 2024-12-02

## 2024-12-02 PROBLEM — F32.A ANXIETY AND DEPRESSION: Status: RESOLVED | Noted: 2023-06-26 | Resolved: 2024-12-02

## 2024-12-02 PROBLEM — J45.909 REACTIVE AIRWAY DISEASE (HHS-HCC): Status: RESOLVED | Noted: 2023-09-20 | Resolved: 2024-12-02

## 2024-12-02 PROBLEM — R45.89 ANXIETY ABOUT BLUSHING: Status: RESOLVED | Noted: 2023-09-20 | Resolved: 2024-12-02

## 2024-12-02 RX ORDER — PREDNISONE 10 MG/1
TABLET ORAL
Qty: 30 TABLET | Refills: 0 | Status: SHIPPED | OUTPATIENT
Start: 2024-12-02 | End: 2024-12-02

## 2024-12-02 RX ORDER — FLUTICASONE PROPIONATE AND SALMETEROL 250; 50 UG/1; UG/1
1 POWDER RESPIRATORY (INHALATION)
Qty: 60 EACH | Refills: 2 | Status: SHIPPED | OUTPATIENT
Start: 2024-12-02 | End: 2025-03-02

## 2024-12-02 RX ORDER — ALBUTEROL SULFATE 90 UG/1
2 INHALANT RESPIRATORY (INHALATION) EVERY 4 HOURS PRN
Qty: 8.5 G | Refills: 0 | Status: SHIPPED | OUTPATIENT
Start: 2024-12-02 | End: 2025-12-02

## 2024-12-02 RX ORDER — PREDNISONE 10 MG/1
TABLET ORAL
Qty: 30 TABLET | Refills: 0 | Status: SHIPPED | OUTPATIENT
Start: 2024-12-02 | End: 2024-12-22

## 2024-12-02 RX ORDER — AZITHROMYCIN 250 MG/1
TABLET, FILM COATED ORAL
Qty: 6 TABLET | Refills: 0 | Status: SHIPPED | OUTPATIENT
Start: 2024-12-02 | End: 2024-12-07

## 2024-12-02 ASSESSMENT — ENCOUNTER SYMPTOMS
BRUISES/BLEEDS EASILY: 0
WHEEZING: 1
COUGH: 1
CONSTIPATION: 0
ABDOMINAL PAIN: 0
ADENOPATHY: 0
FATIGUE: 0
JOINT SWELLING: 0
SEIZURES: 0
EYE PAIN: 0
WOUND: 0
CHEST TIGHTNESS: 1
DIFFICULTY URINATING: 0
DIZZINESS: 0
SHORTNESS OF BREATH: 1
DIARRHEA: 0
TROUBLE SWALLOWING: 0
MYALGIAS: 0
WEAKNESS: 0
NAUSEA: 0
ABDOMINAL DISTENTION: 0
FEVER: 0
COLOR CHANGE: 0
SINUS PRESSURE: 1
CHILLS: 0
SINUS PAIN: 1
PALPITATIONS: 0
HEADACHES: 0

## 2024-12-02 NOTE — ASSESSMENT & PLAN NOTE
Reportedly stable off of maintenance medication.  Patient takes as needed lorazepam very sparingly.  If it additional prescription is needed, she will need to complete a urine drug screen.  Patient to notify provider for any persistent or new mood concerns.

## 2024-12-02 NOTE — ASSESSMENT & PLAN NOTE
Patient continues on Eliquis twice daily without issue.  Bilateral lower extremity blood clots originally reported in 2022.  Patient still reports history of blood clots as a teenager.  She states that Crohn's disease has put her at an increased risk for clotting.  Per patient, lifelong anticoagulation is needed
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
## 2024-12-02 NOTE — ASSESSMENT & PLAN NOTE
Most recent blood work reviewed with patient.  Will continue to monitor as appropriate.  -Unclear as to when patient's most recent well woman/pelvic/Pap exam has been completed?  Will discuss with patient at subsequent visit

## 2024-12-02 NOTE — ASSESSMENT & PLAN NOTE
Patient continues on iron supplement due to deficiency.  Will continue to monitor blood counts and iron levels routinely

## 2024-12-02 NOTE — ASSESSMENT & PLAN NOTE
Due to length and severity of symptoms will initiate course of oral antibiotics and steroid taper for asthma exacerbation.  Provider to be notified for any persistent/worsening pulmonary/respiratory concerns.  Albuterol rescue inhaler refilled and to be kept on patient at all times.

## 2024-12-31 ENCOUNTER — APPOINTMENT (OUTPATIENT)
Dept: NUTRITION | Facility: HOSPITAL | Age: 39
End: 2024-12-31
Payer: COMMERCIAL

## 2025-02-28 DIAGNOSIS — I82.413 ACUTE BILATERAL DEEP VEIN THROMBOSIS (DVT) OF FEMORAL VEINS (MULTI): ICD-10-CM

## 2025-02-28 DIAGNOSIS — K21.9 CHRONIC GERD: ICD-10-CM

## 2025-02-28 RX ORDER — PANTOPRAZOLE SODIUM 20 MG/1
20 TABLET, DELAYED RELEASE ORAL DAILY
Qty: 90 TABLET | Refills: 2 | Status: SHIPPED | OUTPATIENT
Start: 2025-02-28

## 2025-03-19 ENCOUNTER — TELEPHONE (OUTPATIENT)
Dept: PRIMARY CARE | Facility: CLINIC | Age: 40
End: 2025-03-19
Payer: OTHER GOVERNMENT

## 2025-03-19 DIAGNOSIS — I82.90 DEEP VEIN THROMBOSIS (DVT) OF NON-EXTREMITY VEIN, UNSPECIFIED CHRONICITY: Primary | ICD-10-CM

## 2025-03-19 DIAGNOSIS — D68.9 BLOOD COAGULATION DISORDER (MULTI): ICD-10-CM

## 2025-03-19 NOTE — TELEPHONE ENCOUNTER
Pt phoned the office regarding her Eliquis and Pantoprazole rx's that were sent to [facility]; never received her meds and is out. I called [facility] to check her status on the plan and she is no longer covered; husbands employer gave them the impression she would have coverage until th end of March; unfortunately this was not accurate information; with this being said pt is in need of her Eliquis and maybe our Pharmacy rep Abby can assist her with the dilemma.

## 2025-03-20 NOTE — TELEPHONE ENCOUNTER
Orders placed today for clinical pharmacy consult.  Can you please update the patient that it may be a few days before someone reaches out to her?  It is strongly encouraged that she continue this medication and I would be happy to send a short-term prescription to any pharmacy for her

## 2025-03-20 NOTE — TELEPHONE ENCOUNTER
ROSHAN Estrada [facility] conversation [facility]7 hours ago (8:21 AM)     ROSHAN Estrada7 hours ago (8:21 AM)       Orders placed today for clinical pharmacy consult.  Can you please update the patient that it may be a few days before someone reaches out to her?  It is strongly encouraged that she continue this medication and I would be happy to send a short-term prescription to any pharmacy for her         Note        You  ROSHAN EstradaYesterday (4:03 PM)       Pt phoned the office regarding her Eliquis and Pantoprazole rx's that were sent to [facility]; never received her meds and is out. I called [facility] to check her status on the plan and she is no longer covered; husbands employer gave them the impression she would have coverage until th end of March; unfortunately this was not accurate information; with this being said pt is in need of her Eliquis and maybe our Pharmacy rep Abby can assist her with the dilemma.     YouYesterday (4:03 PM)       Pt phoned the office regarding her Eliquis and Pantoprazole rx's that were sent to [facility]; never received her meds and is out. I called [facility] to check her status on the plan and she is no longer covered; husbands employer gave them the impression she would have coverage until th end of March; unfortunately this was not accurate information; with this being said pt is in need of her Eliquis and maybe our Pharmacy rep Abby can assist her with the dilemma.          Note        Taya Sultana N [phone]  YouYesterday (3:54 PM)   Pt notified via [employer] msg. [place]

## 2025-03-21 DIAGNOSIS — K21.9 CHRONIC GERD: ICD-10-CM

## 2025-03-21 DIAGNOSIS — I82.413 ACUTE BILATERAL DEEP VEIN THROMBOSIS (DVT) OF FEMORAL VEINS (MULTI): ICD-10-CM

## 2025-03-21 RX ORDER — PANTOPRAZOLE SODIUM 20 MG/1
20 TABLET, DELAYED RELEASE ORAL DAILY
Qty: 30 TABLET | Refills: 0 | Status: SHIPPED | OUTPATIENT
Start: 2025-03-21

## 2025-04-02 ENCOUNTER — APPOINTMENT (OUTPATIENT)
Dept: PHARMACY | Facility: HOSPITAL | Age: 40
End: 2025-04-02
Payer: OTHER GOVERNMENT

## 2025-04-02 DIAGNOSIS — I82.413 ACUTE BILATERAL DEEP VEIN THROMBOSIS (DVT) OF FEMORAL VEINS (MULTI): ICD-10-CM

## 2025-04-02 DIAGNOSIS — I82.90 DEEP VEIN THROMBOSIS (DVT) OF NON-EXTREMITY VEIN, UNSPECIFIED CHRONICITY: ICD-10-CM

## 2025-04-02 DIAGNOSIS — D68.9 BLOOD COAGULATION DISORDER (MULTI): ICD-10-CM

## 2025-04-02 NOTE — Clinical Note
So I kept the appointment with Taya just in case (previous issues with  Rx coverage and Eliquis) and it sounds as if she is still having insurance issues- I have started the enrollment process for the [employer] Patient Assistance Program just in case. She should be returning a completed document to the office within the next ~1 week. You'll just need to sign and date when she returns it!

## 2025-04-02 NOTE — PROGRESS NOTES
Outpatient Clinical Pharmacy Visit  Taya Sultana is a 39 y.o. female who was referred to the ambulatory Clinical Pharmacy Team for their Medication Cost Assistance (Eliquis).    Referring Provider: KIRTI Estrada-CNP    Allergies  Allergies   Allergen Reactions    Doxycycline Anaphylaxis    Promethazine Anaphylaxis    Amoxicillin Hives    Mesalamine Diarrhea and Nausea/vomiting    Sulfamethoxazole-Trimethoprim Hives    Adhesive Tape-Silicones Rash       Preferred Pharmacy  [facility] - [place], [place] - [address]  [address]  [address]  [facility] [address]  Phone: [phone] Fax: [phone]    [id number] - [place], [place] - [address]  [address] [facility] [address]  Phone: [phone] Fax: [phone]    Medication Access  Cost barriers: Yes  Enrolled in  PAP: No  Manages own medication: Yes  Transportation to the pharmacy:   Frequency of missed doses: Rare    Eliquis Assessment  Taya has currently been on Eliquis for approximately 1-2 years. She has a concurrent diagnosis of Crohn's disease, notes a possible blood clotting disorder (unable to locate historical hospital notes suggestive of this), and has a history of multiple DVTs- she recalls two blood clots in total.     Today, she denies any bleeding around the gums, excessive bruising, or black/tarry stools. Aware to head to ER immediately if she sustains a fall/hits head.     Her  has recently transitioned off [employer] insurance (both medical and prescription coverage) after his company suddenly moved several hours away. He is now employed, but must be employed for 90+ days before prescription coverage will activate. She recently paid ~$535 for a single 30-day supply of Eliquis.    Discussed with Taya the possible options:  Unable to assist with  PAP (must have prescription coverage)  GoodRx is actually more expensive than out-of-pocket cost for Eliquis  30-day free trial voucher  has already been used, and cannot be applied  Eliquis 's assistance program through [employer]    Eliquis Dosing  Indication: prevention of venous thromboembolism  Patient is projected to be on anticoagulation: Indefinitely  Dosing  Patient reported current dosin mg BID  Age: 40 yo  Weight: 129 kg  Scr: 0.83 as of 10/2/24    Laboratory Results  Lab Results   Component Value Date    BILITOT 0.2 10/02/2024    CALCIUM 8.9 10/02/2024    CO2 28 10/02/2024     10/02/2024    CREATININE 0.83 10/02/2024    GLUCOSE 94 10/02/2024    ALKPHOS 67 10/02/2024    K 4.2 10/02/2024    PROT 6.7 10/02/2024     10/02/2024    AST 11 10/02/2024    ALT 6 (L) 10/02/2024    BUN 9 10/02/2024    ANIONGAP 11 10/02/2024    ALBUMIN 3.4 10/02/2024    GFRF >90 2022    EGFR >90 10/02/2024     Lab Results   Component Value Date    TRIG 104 10/02/2024    CHOL 138 10/02/2024    HDL 56.7 10/02/2024     Lab Results   Component Value Date    HGBA1C 4.9 10/02/2024       Assessment/Plan   Problem List Items Addressed This Visit       Blood coagulation disorder (Multi)    Relevant Orders    Referral to Clinical Pharmacy    Deep venous thrombosis (Multi)    Relevant Orders    Referral to Clinical Pharmacy     Assessment  Today, I deem the continuation of Taya's Eliquis 5 mg BID to be appropriate given her above medical history.   Per her previous history, Taya is at a greater chance of developing a DVT/embolism if she were to discontinue Eliquis due to cost.     Plan/Changes   Continue all meds under the continuation of care with the referring provider and clinical pharmacy team  Start Eliquis 5 mg BID, [facility] to attempt free 30-day voucher  Start enrollment in [employer] Patient Assistance Program- this has been filled out to the best of this pharmacist's ability and emailed to patient at [email or web address] for her to print out, complete, and return to PCP Azalea Church's  office.     Next PCP Follow-Up  6/5/25    Next Clinical Pharmacy Follow-Up  2 weeks  [email or web address] PAP updates (information emailed to patient at above email address)      Ellie Elizabeth, Nae     Verbal consent to manage patient's drug therapy was obtained from the patient. They were informed they may decline to participate or withdraw from participation in pharmacy services at any time.

## 2025-04-15 ENCOUNTER — APPOINTMENT (OUTPATIENT)
Dept: PHARMACY | Facility: HOSPITAL | Age: 40
End: 2025-04-15
Payer: OTHER GOVERNMENT

## 2025-04-15 ENCOUNTER — TELEPHONE (OUTPATIENT)
Dept: PHARMACY | Facility: HOSPITAL | Age: 40
End: 2025-04-15

## 2025-04-15 DIAGNOSIS — I82.90 DEEP VEIN THROMBOSIS (DVT) OF NON-EXTREMITY VEIN, UNSPECIFIED CHRONICITY: Primary | ICD-10-CM

## 2025-04-15 DIAGNOSIS — D68.9 BLOOD COAGULATION DISORDER (MULTI): ICD-10-CM

## 2025-04-15 DIAGNOSIS — I82.413 ACUTE BILATERAL DEEP VEIN THROMBOSIS (DVT) OF FEMORAL VEINS (MULTI): ICD-10-CM

## 2025-04-15 NOTE — PROGRESS NOTES
Outpatient Clinical Pharmacy Visit  Taya Sultana is a 39 y.o. female who was referred to the ambulatory Clinical Pharmacy Team for their Medication Cost Assistance (Eliquis).    Referring Provider: KIRTI Estrada-CNP    Discussion  Brief discussion today with Taya. [facility] has been unable to utilize free trial of Eliquis, as this has been already utilized before (once-in-a-lifetime voucher).     [employer] Patient Assistance Program documentation was completed by prescriber yesterday 4/14/25 and was faxed today 4/15/25 (see media). Confirmed verbally with [employer] that faxed application has been received.     Taya reports that she has about 1-1.5 weeks of Eliquis remaining. I called [employer] to update to 'urgent,' however, they will only classify applications as urgent once the patient has approximately 3 days' of Eliquis remaining.     Will follow-up in 1 week for review of progress with [employer].       Assessment/Plan   Problem List Items Addressed This Visit       Blood coagulation disorder (Multi)    Relevant Orders    Referral to Clinical Pharmacy    Deep venous thrombosis (Multi) - Primary    Relevant Orders    Referral to Clinical Pharmacy     Other Visit Diagnoses       Acute bilateral deep vein thrombosis (DVT) of femoral veins (Multi)        Relevant Orders    Referral to Clinical Pharmacy          Plan/Changes   Continue all meds under the continuation of care with the referring provider and clinical pharmacy team    Next PCP Follow-Up  6/5/25    Next Clinical Pharmacy Follow-Up  4/22/25  - Updates on [employer] program approval/denial      Ellie Elizabeth, PharmD     Verbal consent to manage patient's drug therapy was obtained from the patient. They were informed they may decline to participate or withdraw from participation in pharmacy services at any time.

## 2025-04-15 NOTE — TELEPHONE ENCOUNTER
Called [employer] ([facility]) Patient Assistance Program regarding application for Taya.   [facility] confirms receiving application faxed over this morning  Typically a 3-5 business day turnaround time for application determinations.   Clarified that they will only be able to update an application to 'urgent' once only 3 days of Eliquis remain.  Once updated to 'urgent,' [facility] pharmacy is able to process expedited shipping for Eliquis deliveries on Mondays, Tuesdays, and Wednesdays.     Will call [facility] on Friday if no determination received to request expedited review/shipping.

## 2025-04-21 ENCOUNTER — TELEPHONE (OUTPATIENT)
Dept: PHARMACY | Facility: HOSPITAL | Age: 40
End: 2025-04-21
Payer: OTHER GOVERNMENT

## 2025-04-22 ENCOUNTER — APPOINTMENT (OUTPATIENT)
Dept: PHARMACY | Facility: HOSPITAL | Age: 40
End: 2025-04-22
Payer: OTHER GOVERNMENT

## 2025-04-25 ENCOUNTER — TELEPHONE (OUTPATIENT)
Dept: PRIMARY CARE | Facility: CLINIC | Age: 40
End: 2025-04-25
Payer: OTHER GOVERNMENT

## 2025-04-25 DIAGNOSIS — K21.9 CHRONIC GERD: ICD-10-CM

## 2025-04-25 RX ORDER — PANTOPRAZOLE SODIUM 20 MG/1
20 TABLET, DELAYED RELEASE ORAL DAILY
Qty: 90 TABLET | Refills: 2 | Status: SHIPPED | OUTPATIENT
Start: 2025-04-25

## 2025-04-28 ENCOUNTER — TELEPHONE (OUTPATIENT)
Dept: PRIMARY CARE | Facility: CLINIC | Age: 40
End: 2025-04-28
Payer: OTHER GOVERNMENT

## 2025-04-28 DIAGNOSIS — K21.9 CHRONIC GERD: ICD-10-CM

## 2025-04-28 RX ORDER — PANTOPRAZOLE SODIUM 20 MG/1
20 TABLET, DELAYED RELEASE ORAL DAILY
Qty: 90 TABLET | Refills: 2 | Status: SHIPPED | OUTPATIENT
Start: 2025-04-28

## 2025-06-05 ENCOUNTER — APPOINTMENT (OUTPATIENT)
Dept: PRIMARY CARE | Facility: CLINIC | Age: 40
End: 2025-06-05
Payer: OTHER GOVERNMENT